# Patient Record
(demographics unavailable — no encounter records)

---

## 2025-01-17 NOTE — PROCEDURE
[de-identified] : -   Pre-operative Diagnosis: Dysphonia, Throat discomfort Post-operative Diagnosis: laryngopharyngeal reflux, muscle tension dysphonia Anesthesia: Topical - 1 % Lidocaine/Phenylephrine Procedure: Flexible Laryngoscopy with Stroboscopy - [facility] [id number]   Procedure Details: The patient was placed in the sitting position. After decongestant and anesthesia were applied the laryngoscope was passed. The nasal cavities, nasopharynx, oropharynx, hypopharynx, and larynx were all examined. Vocal folds were examined during respiration and phonation. The following findings were noted:   Findings: Nose: Septum is midline, turbinates are normal, nasal airways patent, mucosa normal Nasopharynx: Adenoids normal, no masses, eustachian tube normal Oropharynx: Pharyngeal walls symmetric and without lesion. Tonsils/fossae symmetric Hypopharynx: Hypopharynx and pyriform sinuses without lesion. No masses or asymmetry. Larynx: Epiglottis and aryepiglottic folds were sharp and crisp bilaterally. Bilateral false vocal folds normal appearance. Airway was widely patent. + postcricoid edema, erythema of the vocal process   Strobe Exam Ratings   TVF Appearance: normal, mild erythema of the vocal process TVF Mobility: normal Edema/hypertrophy: normal, + postcricoid edema Mucus on TVF: normal Glottic Closure: normal/adequate Mucosal Wave: normal Amplitude of Vibration: normal Phase: symmetric Supraglottic Hyperfunction: muscle tension dysphonia Other Findings: none   Condition: Stable. Patient tolerated procedure well.   Complications: None   --------------------------------------------------------------------   Procedure: Pharyngeal and speech evaluation, by cine or video - CPT 41211 Voice assessment was recorded via video recording on this date.   Clarity: hoarse voice Range: reduced Pitch Control: reduced Projection: adequate Tremor: none Cough: none   Condition: Stable. Patient tolerated procedure well. Complications: None.

## 2025-01-17 NOTE — PHYSICAL EXAM
[Normal] : mucosa is normal [Midline] : trachea located in midline position [FreeTextEntry1] : voice mildly hoarse, adequate projection and range

## 2025-01-17 NOTE — ASSESSMENT
[FreeTextEntry1] : - 1/17/25: 42 y/o M presents with snorting and throat clearing for the past 10 years, worsened approximately 1 month ago. He also feels his voice has been more hoarse, worse with overuse. Based on history and physical exam, I do believe there is a component of laryngopharyngeal reflux. At this time I am recommending dietary and behavioral change to reduce acid in the diet. I am also recommending he continue with Voquenza (Potassium acid blocker) for a total of 3 months. He was also found to have evidence of muscle tension dysphonia. I am recommending consultation with SLP for speech therapy. Follow up in 3 months. If symptoms persist consider switching antireflux medications.    -Dietary and behavioral modification to reduce acid reflux, handout given -Continue with Voquenza  -Voice hygiene, increase hydration, sips of water throughout the day, avoid throat clearing, cough avoidance  -Speech therapy  -Follow up in 3 months for repeat evaluation

## 2025-01-17 NOTE — HISTORY OF PRESENT ILLNESS
[de-identified] : 1/17/25: 44 y/o M presents with snorting and throat clearing for the past 10 years, worsened approximately 1 month ago. He also feels his voice has been more hoarse, worse with overuse. No issues eating, chewing, or swallowing. He clears his throat frequently, no cough. No issues breathing. When he is talking he feels he "is not breathing fully." No regurgitation of food. Works as an , significant voice demands. Nonsmoker. He has known acid reflux and was started on Voquenza (Potassium acid blocker) for the past 3 weeks per GI. On upper endoscopy he was found to have reflux and "inflammation" of vocal cords 1.5 weeks ago and GI recommended follow up with ENT. Biopsy/ H. pylori is pending.   Diet: +coffee, tomato, citrus, garlic, onion, blueberry, spicy foods, carbonated beverages  - tea, soda, chocolate, mint water intake: 8-10 late night eating: no